# Patient Record
Sex: FEMALE | Race: WHITE
[De-identification: names, ages, dates, MRNs, and addresses within clinical notes are randomized per-mention and may not be internally consistent; named-entity substitution may affect disease eponyms.]

---

## 2018-10-11 NOTE — DIAGNOSTIC IMAGING REPORT
EXAM: Pelvic ultrasound.



INDICATION: Abnormal uterine bleeding



The previous pelvic ultrasound exam of 06/23/2015 failed to show

any sign of an acute pelvic abnormality. On this exam, the uterus

is nongravid, retroverted and not enlarged measuring 5 x 5.0 x

4.0 cm. The endometrial lining is not thickened measuring 5-6 mm.

There is no focal mass involving the uterus to suggest a fibroid.



In the interval since the prior exam, a 4.8 x 3.8 x 4.6 cm

well-circumscribed hypoechoic lesion has developed in the right

adnexa. Most likely this represent a large cyst arising from the

right ovary. This cyst has a generally benign appearance although

there may be a few internal echoes. This suggests that this maybe

slightly complicated by infection and/or hemorrhage.



The left ovary is unremarkable. There is good blood flow to each

ovary and there is no sign of torsion.



There is no solid pelvic mass identified but there was a small

amount of free fluid in the pelvis.



IMPRESSION:

1. There is a large 4.8 x 3.8 x 4.6 cm slightly complicated cyst

associated with the right ovary. If further evaluation is

desired, laparoscopy should be considered. If there is no

intervention at this time, then a short-term (4-6 week) followup

ultrasound exam should be obtained. 

2. Aside from a small amount of free fluid in the pelvis, there

is no acute abnormality noted otherwise.



Dictated by: 



  Dictated on workstation # [id number]

## 2018-11-10 NOTE — DISCHARGE INST-WOMEN'S SERVICE
Discharge Inst-Women's Serv


Depart Medication/Instructions


New, Converted or Re-Newed RX:  RX on Chart





Consults/Follow Up


Additional Follow Up:  Yes


Orders/Referrals


Dr. Ryan in 7-10 days





Activity


Activity:  Activity as Tolerated


Driving Instructions:  You May Drive (do not drive while taking oxycodone)


NO SMOKING:  NO SMOKING


Nothing Inside Vagina:  No Douching, No [place], No Tampons





Diet


Discharge Diet:  No Restrictions


Symptoms to Report to :  Bleeding Excessive, Pain Increased, Fever Over 101 

Degrees F, Vaginal Bleeding Increase, Questions/Concerns


For Any Problems or Questions:  Contact Your Physician





Skin/Wound Care


Infection Signs and Symptoms:  Increased Redness, Foul Odor of Wound, Increased 

Drainage, Skin Itchy or Has a Rash, Increased Swelling, Temperature Above 101  F


Operative Area Clean and Dry:  Keep Incision Clean/Dry


Stitches/Staples/Dermabond:  Dermabond, Care of Stitches


Bathing Instructions:  FANI Irby DO Nov 10, 2018 19:09
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
## 2018-11-10 NOTE — ANESTHESIA-GENERAL POST-OP
General


Patient Condition


Mental Status/LOC:  Same as Preop


Cardiovascular:  Satisfactory


Nausea/Vomiting:  Absent


Respiratory:  Satisfactory


Pain:  Controlled


Complications:  Absent





Post Op Complications


Complications


None





Follow Up Care/Instructions


Patient Instructions


None needed.





Anesthesia/Patient Condition


Patient Condition


Patient is doing well, no complaints, stable vital signs, no apparent adverse 

anesthesia problems.   


No complications reported per nursing.











MARJAN CANNON CRNA Nov 10, 2018 18:35

## 2018-11-10 NOTE — XMS REPORT
[facility]

 Created on: 2018



Lourdes Miramontes

External Reference #: [id number]

: 2000

Sex: Female



Demographics







 Address  [address] [place]  [address]

 

 Preferred Language  Unknown

 

 Marital Status  Unknown

 

 Faith Affiliation  Unknown

 

 Race  Unknown

 

 Ethnic Group  Unknown





Author







 Author  Sabrina Nguyễn  [facility]

 

 Address  [address] [place] [address]



 

 Phone  [phone]







Care Team Providers







 Care Team Member Name  Role  Phone

 

 Sabrina Nguyễn  Unavailable  [phone]







PROBLEMS







 Type  Condition  ICD9-CM Code  AYQ70-RR Code  Onset Dates  Condition Status  
SNOMED Code

 

 Problem  Irregular menses     N92.6     Active  49368233

 

 Problem  Hashimoto's disease     E06.3     Active  23150249

 

 Problem  Other specified hypothyroidism     E03.8     Active  061845063

 

 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







ALLERGIES

No Information



ENCOUNTERS







 Encounter  Location  Date  Diagnosis

 

 [facility]  [address]  [place] [address]  20 Sep, 
2018   

 

 [facility]  [address]  [place] [address]  20 Sep, 
2018   

 

 [facility]  [address]  [place] [address]  19 Sep, 
2018   

 

 [facility]  [address] [place] [address]  14 Sep, 
2018   

 

 [facility]  [address] [place] [address]  14 Sep, 
2018  Irregular menses N92.6 ; Hashimoto's disease E06.3 and At high risk for 
breast cancer Z91.89

 

 [facility]  [address]  [place] [address]  04 Sep, 
2018   

 

 [facility]  [address]  [place] [address]     

 

 [facility]  [address]  [place] [address]     

 

 [facility]  [address] [place] [address]  18 Sep, 
2017   

 

 [facility]  [address]  [place] [address]  17 Sep, 
2017   

 

 [facility]  [address]  [place] [address]  15 Aug, 
2017  Other specified hypothyroidism E03.8

 

 [facility]  [address]  [place] [address]  14 Aug, 
2017   

 

 [facility]  [address]  [place] [address]  14 Aug, 
2017  Other specified hypothyroidism E03.8

 

 [facility]  [address]  [place] [address]    Other specified hypothyroidism E03.8 ; Femoral rotation, congenital Q74.2 
; Family history of breast cancer Z80.3 and At high risk for breast cancer 
Z91.89







IMMUNIZATIONS

No Known Immunizations



SOCIAL HISTORY

Never Assessed



REASON FOR VISIT

Lourdes Miramontes



PLAN OF CARE





VITAL SIGNS





MEDICATIONS

Unknown Medications



RESULTS

No Results



PROCEDURES

No Known procedures



INSTRUCTIONS





MEDICATIONS ADMINISTERED

No Known Medications



MEDICAL (GENERAL) HISTORY







 Type  Description  Date

 

 Medical History  migraine headaches with aura   

 

 Medical History  Hashimotos hypothyroidism, sees endocrine, VC   

 

 Medical History  Maternal grandmother with BRCA 2, patient's mother has had 
not tested positive, saw genetic counselor and no testing needed   

 

 Medical History  bilateral femoral derotation, has hardware in both femurs, 
sees Dr. Zuniga. On narcotics for this issue   

 

 Medical History  Lifetime risk breast cancer 28%   

 

 Surgical History  R knee scope  

 

 Surgical History  bilateral femeral derotation 2014

 

 Surgical History  hardware removal

## 2018-11-10 NOTE — XMS REPORT
[facility]

 Created on: 2018



Lourdes Miramontes

External Reference #: [id number]

: 2000

Sex: Female



Demographics







 Address  [address] [place]  [address]

 

 Preferred Language  Unknown

 

 Marital Status  Unknown

 

 Hoahaoism Affiliation  Unknown

 

 Race  Unknown

 

 Ethnic Group  Unknown





Author







 Author  Sabrina Nguyễn  [facility]

 

 Address  [address] [place] [address]



 

 Phone  [phone]







Care Team Providers







 Care Team Member Name  Role  Phone

 

 Sabrina Nguyễn  Unavailable  [phone]







PROBLEMS







 Type  Condition  ICD9-CM Code  NEC16-AQ Code  Onset Dates  Condition Status  
SNOMED Code

 

 Problem  Irregular menses     N92.6     Active  99361143

 

 Problem  Hashimoto's disease     E06.3     Active  46058374

 

 Problem  Other specified hypothyroidism     E03.8     Active  161562966

 

 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







ALLERGIES

No Information



ENCOUNTERS







 Encounter  Location  Date  Diagnosis

 

 [facility]  [address]  [place] [address]  20 Sep, 
2018   

 

 [facility]  [address]  [place] [address]  20 Sep, 
2018   

 

 [facility]  [address]  [place] [address]  19 Sep, 
2018   

 

 [facility]  [address] [place] [address]  14 Sep, 
2018   

 

 [facility]  [address] [place] [address]  14 Sep, 
2018  Irregular menses N92.6 ; Hashimoto's disease E06.3 and At high risk for 
breast cancer Z91.89

 

 [facility]  [address]  [place] [address]  04 Sep, 
2018   

 

 [facility]  [address]  [place] [address]     

 

 [facility]  [address]  [place] [address]     

 

 [facility]  [address] [place] [address]  18 Sep, 
2017   

 

 [facility]  [address]  [place] [address]  17 Sep, 
2017   

 

 [facility]  [address]  [place] [address]  15 Aug, 
2017  Other specified hypothyroidism E03.8

 

 [facility]  [address]  [place] [address]  14 Aug, 
2017   

 

 [facility]  [address]  [place] [address]  14 Aug, 
2017  Other specified hypothyroidism E03.8

 

 [facility]  [address]  [place] [address]    Other specified hypothyroidism E03.8 ; Femoral rotation, congenital Q74.2 
; Family history of breast cancer Z80.3 and At high risk for breast cancer 
Z91.89







IMMUNIZATIONS

No Known Immunizations



SOCIAL HISTORY

Never Assessed



REASON FOR VISIT

post pelvic exam



PLAN OF CARE





VITAL SIGNS





MEDICATIONS

Unknown Medications



RESULTS

No Results



PROCEDURES

No Known procedures



INSTRUCTIONS





MEDICATIONS ADMINISTERED

No Known Medications



MEDICAL (GENERAL) HISTORY







 Type  Description  Date

 

 Medical History  migraine headaches with aura   

 

 Medical History  Hashimotos hypothyroidism, sees endocrine, VC   

 

 Medical History  Maternal grandmother with BRCA 2, patient's mother has had 
not tested positive, saw genetic counselor and no testing needed   

 

 Medical History  bilateral femoral derotation, has hardware in both femurs, 
sees Dr. Zuniga. On narcotics for this issue   

 

 Medical History  Lifetime risk breast cancer 28%   

 

 Surgical History  R knee scope  

 

 Surgical History  bilateral femeral derotation 2014

 

 Surgical History  hardware removal

## 2018-11-10 NOTE — XMS REPORT
[facility]

 Created on: 2018



Lourdes Miramontes

External Reference #: [id number]

: 2000

Sex: Female



Demographics







 Address  [address] [place]  [address]

 

 Preferred Language  Unknown

 

 Marital Status  Unknown

 

 Sikhism Affiliation  Unknown

 

 Race  Unknown

 

 Ethnic Group  Unknown





Author







 Author  Sabrina Nguyễn

 

 [facility]

 

 Address  [address] [place]  [address]



 

 Phone  [phone]







Care Team Providers







 Care Team Member Name  Role  Phone

 

 Sabrina Nguyễn  Unavailable  [phone]







PROBLEMS







 Type  Condition  ICD9-CM Code  XQV41-AK Code  Onset Dates  Condition Status  
SNOMED Code

 

 Problem  Other specified hypothyroidism     E03.8     Active  941223372

 

 Problem  Femoral rotation, congenital     Q74.2     Active  576169309







ALLERGIES

No Information



SOCIAL HISTORY

Never Assessed



PLAN OF CARE





VITAL SIGNS





MEDICATIONS

Unknown Medications



RESULTS

No Results



PROCEDURES

No Known procedures



IMMUNIZATIONS

No Known Immunizations



MEDICAL (GENERAL) HISTORY







 Type  Description  Date

 

 Medical History  migraine headaches   

 

 Medical History  Hashimotos hypothyroidism   

 

 Medical History  Maternal grandmother with BRCA 2, patient's mother has had 
not tested positive   

 

 Medical History  bilateral femoral derotation, has hardware in both femurs, 
sees Dr. Zuniga. On narcotics for this issue   

 

 Surgical History  R knee scope  

 

 Surgical History  bilateral femeral derotation 2014

 

 Surgical History  hardware removal

## 2018-11-10 NOTE — XMS REPORT
[facility]

 Created on: 2017



Lourdes Miramontes

External Reference #: [id number]

: 2000

Sex: Female



Demographics







 Address  [address] [place]  [address]

 

 Preferred Language  Unknown

 

 Marital Status  Unknown

 

 [employer] Affiliation  Unknown

 

 Race  Unknown

 

 Ethnic Group  Unknown





Author







 Author  Sabrina Nguyễn

 

 [facility]

 

 Address  [address] [place]  [address]



 

 Phone  [phone]







Care Team Providers







 Care Team Member Name  Role  Phone

 

 Sabrina Nguyễn  Unavailable  [phone]







PROBLEMS







 Type  Condition  ICD9-CM Code  STJ43-IU Code  Onset Dates  Condition Status  
SNOMED Code

 

 Problem  Other specified hypothyroidism     E03.8     Active  406672954

 

 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







ALLERGIES

No Information



SOCIAL HISTORY

Never Assessed



PLAN OF CARE





VITAL SIGNS





MEDICATIONS

Unknown Medications



RESULTS

No Results



PROCEDURES

No Known procedures



IMMUNIZATIONS

No Known Immunizations



MEDICAL (GENERAL) HISTORY







 Type  Description  Date

 

 Medical History  migraine headaches   

 

 Medical History  Hashimotos hypothyroidism   

 

 Medical History  Maternal grandmother with BRCA 2, patient's mother has had 
not tested positive   

 

 Medical History  bilateral femoral derotation, has hardware in both femurs, 
sees Dr. Zuniga. On narcotics for this issue   

 

 Surgical History  R knee scope  

 

 Surgical History  bilateral femeral derotation 2014

 

 Surgical History  hardware removal

## 2018-11-10 NOTE — XMS REPORT
[facility]

 Created on: 09/15/2018



Lourdes Miramontes

External Reference #: [id number]

: 2000

Sex: Female



Demographics







 Address  [address] [place]  [address]

 

 Preferred Language  Unknown

 

 Marital Status  Unknown

 

 [employer] Affiliation  Unknown

 

 Race  Unknown

 

 Ethnic Group  Unknown





Author







 Author  Sabrina Nguyễn

 

 [facility]

 

 Address  [address] [place] [address]



 

 Phone  [phone]







Care Team Providers







 Care Team Member Name  Role  Phone

 

 Sabrina Nguyễn  Unavailable  [phone]







PROBLEMS







 Type  Condition  ICD9-CM Code  UKQ00-KK Code  Onset Dates  Condition Status  
SNOMED Code

 

 Problem  Irregular menses     N92.6     Active  43056285

 

 Problem  Hashimoto's disease     E06.3     Active  32401869

 

 Problem  Other specified hypothyroidism     E03.8     Active  711333934

 

 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







ALLERGIES

No Known Allergies



ENCOUNTERS







 Encounter  Location  Date  Diagnosis

 

 [facility]  [address] [place] [address]  14 Sep, 
2018   

 

 [facility]  [address] [place] [address]  14 Sep, 
2018  Irregular menses N92.6 ; Hashimoto's disease E06.3 and At high risk for 
breast cancer Z91.89

 

 [facility]  [address] [place] [address]  04 Sep, 
2018   

 

 [facility]  [address] [place] [address]     

 

 [facility]  [address] [place] [address]     

 

 [facility]  [address] [place] [address]  18 Sep, 
2017   

 

 [facility]  [address] [place] [address]  17 Sep, 
2017   

 

 [facility]  [address] [place] [address]  15 Aug, 
2017  Other specified hypothyroidism E03.8

 

 [facility]  [address] [place] [address]  14 Aug, 
2017   

 

 [facility]  [address] [place] [address]  14 Aug, 
2017  Other specified hypothyroidism E03.8

 

 [facility]  [address] [place] [address]    Other specified hypothyroidism E03.8 ; Femoral rotation, congenital Q74.2 
; Family history of breast cancer Z80.3 and At high risk for breast cancer 
Z91.89







IMMUNIZATIONS

No Known Immunizations



SOCIAL HISTORY

Never Assessed



REASON FOR VISIT

PELVIC EXAM/IRREGULAR BLEEDING



PLAN OF CARE







 Activity  Details









  









 Pending Test  CBC 

 

 Pending Test  COMPREHENSIVE CHEM PROFILE 

 

 Pending Test  HbA1C 

 

 Pending Test  TSH 

 

 Pending Test  FREE T4 

 

 Pending Test  FSH 

 

 Pending Test  LH 

 

 Pending Test  HCG, BETA QUANT 

 

 Pending Test  CHLAMYDIA/GC AMPLIFICATION  #447065 

 

 Pending Test  Prolactin  #899225 

 

 Pending Test  Progesterone  #594448 

 

 Pending Test  KOH/Saline Wet Prep (Vag, Mouth/Tongue/Mucous Membranes) 

 

 Pending Test  DHEA Sulfate  #792022 



             



VITAL SIGNS







 Weight  107.8 lbs  2018

 

 Height  63 in  2018

 

 Temperature  98.2 degrees Fahrenheit  2018

 

 Heart Rate  74 /min  2018

 

 Oximetry  98 %  2018

 

 BMI  19.09 kg/m2  2018

 

 Blood pressure systolic  120 mm Hg  2018

 

 Blood pressure diastolic  82 mm Hg  2018







MEDICATIONS







 Medication  Instructions  Dosage  Frequency  Start Date  End Date  Duration  
Status

 

 Zyrtec Allergy 10 MG  Orally Once a day  1 tablet  24h           Active

 

 Levothyroxine Sodium 25 MCG  Orally Once a day  1 tablet on an empty stomach 
in the morning  24h        90 days  Active

 

 Hydrocodone-Acetaminophen 5-325 MG  Orally every 6 hrs  1 tablet as needed  6h
           Active

 

 Acetaminophen 325 MG  Orally every 6 hrs  2 capsules as needed  6h           
Active







RESULTS

No Results



PROCEDURES







 Procedure  Date Ordered  Result  Body Site

 

 CHLAMYDIA DNA  2018      

 

 GC DNA  2018      

 

 KOH/SALINE  WET PREP  2018      

 

 CBC  2018      

 

 COMP PROFILE  2018      

 

 TSH  2018      

 

 FREE T4  2018      

 

 PROGESTERONE  2018      

 

 DHEA SULFATE  2018      

 

 HgB A1C  2018      

 

 PROLACTIN  2018      

 

 HCG BETA QUANT  2018      

 

 LH  2018      

 

 FSH  2018      







INSTRUCTIONS





MEDICATIONS ADMINISTERED

No Known Medications



MEDICAL (GENERAL) HISTORY







 Type  Description  Date

 

 Medical History  migraine headaches with aura   

 

 Medical History  Hashimotos hypothyroidism, sees endocrine, VC   

 

 Medical History  Maternal grandmother with BRCA 2, patient's mother has had 
not tested positive, saw genetic counselor and no testing needed   

 

 Medical History  bilateral femoral derotation, has hardware in both femurs, 
sees Dr. Zuniga. On narcotics for this issue   

 

 Medical History  Lifetime risk breast cancer 28%   

 

 Surgical History  R knee scope  

 

 Surgical History  bilateral femeral derotation 2014

 

 Surgical History  hardware removal

## 2018-11-10 NOTE — XMS REPORT
[facility]

 Created on: 2017



Lourdes Miramontes

External Reference #: [id number]

: 2000

Sex: Female



Demographics







 Address  [address] [place]  [address]

 

 Preferred Language  Unknown

 

 Marital Status  Unknown

 

 Sabianism Affiliation  Unknown

 

 Race  Unknown

 

 Ethnic Group  Unknown





Author







 Author  Sabrina Nguyễn

 

 [facility]

 

 Address  [address] [place]  [address]



 

 Phone  [phone]







Care Team Providers







 Care Team Member Name  Role  Phone

 

 Sabrina Nguyễn  Unavailable  [phone]







PROBLEMS







 Type  Condition  ICD9-CM Code  VMX24-AO Code  Onset Dates  Condition Status  
SNOMED Code

 

 Problem  Other specified hypothyroidism     E03.8     Active  563504742

 

 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







ALLERGIES







 Substance  Reaction  Event Type  Date  Status

 

 N.K.D.A.  Unknown  Non Drug Allergy    Unknown







SOCIAL HISTORY

No smoking Hx information available



PLAN OF CARE







 Activity  Details









  









 Follow Up  prn Reason:







VITAL SIGNS







 Weight  104.4 lbs  2017

 

 Height  63 in  2017

 

 Temperature  98.6 degrees Fahrenheit  2017

 

 Heart Rate  74 /min  2017

 

 Oximetry  100 %  2017

 

 BMI  18.49 kg/m2  2017

 

 Blood pressure systolic  98 mm Hg  2017

 

 Blood pressure diastolic  66 mm Hg  2017







MEDICATIONS







 Medication  Instructions  Dosage  Frequency  Start Date  End Date  Duration  
Status

 

 Acetaminophen 325 MG  Orally every 6 hrs  2 capsules as needed  6h           
Active

 

 Zyrtec Allergy 10 MG  Orally Once a day  1 tablet  24h           Active

 

 Levothyroxine Sodium 25 MCG  Orally Once a day  1 tablet on an empty stomach 
in the morning  24h        90 days  Active

 

 Hydrocodone-Acetaminophen 5-325 MG  Orally every 6 hrs  1 tablet as needed  6h
           Active







RESULTS

No Results



PROCEDURES







 Procedure  Date Ordered  Related Diagnosis  Body Site

 

 OFFICE VISITNEW PT  2017      







IMMUNIZATIONS

No Known Immunizations

## 2018-11-10 NOTE — XMS REPORT
[facility]

 Created on: 2017



Lourdes Miramontes

External Reference #: [id number]

: 2000

Sex: Female



Demographics







 Address  [address] [place]  [address]

 

 Preferred Language  Unknown

 

 Marital Status  Unknown

 

 Christian Affiliation  Unknown

 

 Race  Unknown

 

 Ethnic Group  Unknown





Author







 Author  Sabrina Nguyễn

 

 [facility]

 

 Address  [address] [place]  [address]



 

 Phone  [phone]







Care Team Providers







 Care Team Member Name  Role  Phone

 

 Sabrina Nguyễn  Unavailable  [phone]







PROBLEMS







 Type  Condition  ICD9-CM Code  FPR12-PW Code  Onset Dates  Condition Status  
SNOMED Code

 

 Problem  Other specified hypothyroidism     E03.8     Active  435890034

 

 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







ALLERGIES

Unknown Allergies



SOCIAL HISTORY

No smoking Hx information available



PLAN OF CARE





VITAL SIGNS





MEDICATIONS

Unknown Medications



RESULTS

No Results



PROCEDURES

No Known procedures



IMMUNIZATIONS

No Known Immunizations

## 2018-11-10 NOTE — OPERATIVE REPORT
DATE OF SERVICE:  



PREOPERATIVE DIAGNOSES:

1.  A 18-year-old female with acute onset pelvic pain.

2.  Bilateral ovarian cyst.



POSTOPERATIVE DIAGNOSES:

1.  A 18-year-old female with acute onset pelvic pain.

2.  Bilateral ovarian cyst.

3.  Adhesions of the omentum to the anterior cul-de-sac.



PROCEDURE:

Laparoscopic right ovarian cystectomy, left ovarian cystotomy with lysis of

adhesions and evacuation of hemoperitoneum.



SURGEON:

Saad Nam DO



ANESTHESIA:

General endotracheal.



ESTIMATED BLOOD LOSS:

Minimal.



URINE OUTPUT:

50 mL, clear at the end of the procedure.



FLUIDS:

1000 mL lactated Ringer solution.



FINDINGS:

Mildly enlarged right ovary with a simple-appearing right ovarian cyst, several

small multicystic appearing cyst of the left ovary and adhesions of the omentum

to the anterior cul-de-sac.



SPECIMENS SENT:

Right ovarian cyst wall.



INDICATIONS FOR PROCEDURE:

This 18-year-old female is a patient that I ended up admitting after a complaint

of significant amounts of pain that she was having.  The patient had dealt with

ovarian cyst in the past and was followed in my office as an outpatient with

ovarian cyst.  She was attempted to be managed medically.  However, since that

time, her symptoms have progressively gotten worse to the point that they are

kept her up all night and has inhibit her from eating most of the day today. 

Due to these ongoing issues and unrelenting pain today causing the patient to

miss testing, the patient wanted to proceed with more aggressive measures.  At

this point, I discussed with the patient diagnostic laparoscopy.  Risks of

procedure were discussed with the patient in detail.  After all of her questions

were answered, consent was obtained in the preoperative area and the patient was

taken to the operating room.



OPERATIVE REPORT IN DETAIL:

Once in the operating room, general anesthesia was found to be adequate.  She

was placed in the dorsal lithotomy position, prepped and draped in normal

sterile fashion _____ performed a timeout and placing a Schaefer catheter using

sterile technique.  A weighted speculum was inserted into the patient's vagina. 

A right angle retractor was used to visualize the cervix, which was grasped at

12 o'clock position using a long Allis clamp.  I then gently sound the uterine

cavity depth, it was found to be 7 cm.  I then gently dilated the cervix using

Hegar dilators to allow me to place a Kronner uterine manipulator.  Once this

was in place, I removed all the other instruments and performed a change of

gloves.  I took my attention to the abdomen where infraumbilically I infiltrated

this area using 0.25% Marcaine to make a 5 mm incision and directed Veress

needle through the incision until intraperitoneal placement was confirmed using

a saline drop test.  I then proceeded with insufflation using CO2 gas and

opening pressure of 5 mmHg was noted.  I proceeded to maximum pressure of 15

mmHg, at which point I removed the Veress needle and introduced a 5 mm blunt

camera trocar.  Once this was in place, I am able to confirm intraperitoneal

placement using the laparoscope.  I briefly scanned the upper abdominal anatomy.

 I am able to visualize the diaphragm as well as the appendix, which appears

grossly normal appearance.  There is arjun blood noted in the peritoneum of the

pelvis.  I evacuated this after I placed the second trocar.  A suprapubic 5 mm

in the similar fashion to my infraumbilical trocar; however, this one was placed

under direct visualization of the laparoscope.  Once this was in place, I

suctioned out the pelvis using suction irrigation.  I decided to remove the

right ovarian cyst and a portion of the cyst wall.  I do so by placing a third

trocar site this was in the left lower quadrant in similar fashion.  It was also

placed under direct visualization of the laparoscope.  Once this was in place, I

am able to grasp the ovary and isolated.  Therefore, it does not move and then I

am able to create an elliptical incision around the cyst wall of the right

ovarian cyst and gently pulled and dissected off of the right ovary.  Once this

was taken off, I am able to remove one of my trocar sites and sent as right

ovarian cyst wall.  There was no active bleeding noted from anywhere on the

dissection plane of the right ovary.  I then isolated the left ovary and find

multiple small loculated cysts, which were ruptured using EndoShears monopolar

cautery.  Once this was done, there was no active bleeding noted from any of

those cysts as well.  There were also filmy adhesions of multiple sites of the

peritoneum to the anterior cul-de-sac.  These were taken down using the

EndoShears as well, after which there was no active bleeding noted from any of

those dissection planes.  Once again, I copiously irrigated the pelvis using

normal saline.  Once again, there was no active bleeding noted from any of my

dissection planes.  I had the patient taken out of steep Trendelenburg.  I

removed the left lower quadrant trocar under direct visualization of the

laparoscope.  The infraumbilical trocar was also removed under direct

visualization of the laparoscope by moving the camera to the suprapubic port.  I

then leave the suprapubic port in place in order to release insufflation and to

introduce 10 mL of 0.25% Marcaine into the peritoneal cavity.  After this was

done, I then removed the suprapubic port.  I then closed the skin using

Dermabond and Band-Aids were placed over the incisions.  The Kronner uterine

manipulator and Schaefer catheter were removed.  After this was done, the patient

tolerated the procedure well and sent to the recovery area in stable condition. 

Lap and sponge counts were correct at the end of the procedure.  Instrument

counts were correct as well.





Job ID: [id number]

DocumentID: [id number]

Dictated Date:  11/10/2018 19:19:04

Transcription Date: 11/10/2018 23:42:05

Dictated By: SAAD NAM DO

## 2018-11-10 NOTE — XMS REPORT
[facility]

 Created on: 2018



Lourdse Miramontes

External Reference #: [id number]

: 2000

Sex: Female



Demographics







 Address  [address] [place]  [address]

 

 Preferred Language  Unknown

 

 Marital Status  Unknown

 

 Mu-ism Affiliation  Unknown

 

 Race  Unknown

 

 Ethnic Group  Unknown





Author







 Author  Sabrina Nguyễn  [facility]

 

 Address  [address] [place] [address]



 

 Phone  [phone]







Care Team Providers







 Care Team Member Name  Role  Phone

 

 Sabrina Nguyễn  Unavailable  [phone]







PROBLEMS







 Type  Condition  ICD9-CM Code  FAS38-BB Code  Onset Dates  Condition Status  
SNOMED Code

 

 Problem  Irregular menses     N92.6     Active  15603192

 

 Problem  Hashimoto's disease     E06.3     Active  62653750

 

 Problem  Other specified hypothyroidism     E03.8     Active  430829734

 

 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







ALLERGIES

No Information



ENCOUNTERS







 Encounter  Location  Date  Diagnosis

 

 [facility]  [address]  [place] [address]  20 Sep, 
2018   

 

 [facility]  [address]  [place] [address]  20 Sep, 
2018   

 

 [facility]  [address]  [place] [address]  19 Sep, 
2018   

 

 [facility]  [address] [place] [address]  14 Sep, 
2018   

 

 [facility]  [address] [place] [address]  14 Sep, 
2018  Irregular menses N92.6 ; Hashimoto's disease E06.3 and At high risk for 
breast cancer Z91.89

 

 [facility]  [address]  [place] [address]  04 Sep, 
2018   

 

 [facility]  [address]  [place] [address]     

 

 [facility]  [address]  [place] [address]     

 

 [facility]  [address] [place] [address]  18 Sep, 
2017   

 

 [facility]  [address]  [place] [address]  17 Sep, 
2017   

 

 [facility]  [address]  [place] [address]  15 Aug, 
2017  Other specified hypothyroidism E03.8

 

 [facility]  [address]  [place] [address]  14 Aug, 
2017   

 

 [facility]  [address]  [place] [address]  14 Aug, 
2017  Other specified hypothyroidism E03.8

 

 [facility]  [address]  [place] [address]    Other specified hypothyroidism E03.8 ; Femoral rotation, congenital Q74.2 
; Family history of breast cancer Z80.3 and At high risk for breast cancer 
Z91.89







IMMUNIZATIONS

No Known Immunizations



SOCIAL HISTORY

Never Assessed



REASON FOR VISIT

Abbagail



PLAN OF CARE





VITAL SIGNS





MEDICATIONS

Unknown Medications



RESULTS

No Results



PROCEDURES

No Known procedures



INSTRUCTIONS





MEDICATIONS ADMINISTERED

No Known Medications



MEDICAL (GENERAL) HISTORY







 Type  Description  Date

 

 Medical History  migraine headaches with aura   

 

 Medical History  Hashimotos hypothyroidism, sees endocrine, VC   

 

 Medical History  Maternal grandmother with BRCA 2, patient's mother has had 
not tested positive, saw genetic counselor and no testing needed   

 

 Medical History  bilateral femoral derotation, has hardware in both femurs, 
sees Dr. Zuniga. On narcotics for this issue   

 

 Medical History  Lifetime risk breast cancer 28%   

 

 Surgical History  R knee scope  

 

 Surgical History  bilateral femeral derotation 2014

 

 Surgical History  hardware removal
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
## 2018-11-10 NOTE — XMS REPORT
Transition of Care/Referral Summary

 Created on: 2032



MEGAN WILKS

External Reference #: [id number]

: 2000

Sex: Female



Demographics







 [place]  [address]

 

 Home Phone  [phone]

 

 Preferred Language  English

 

 Marital Status  Single

 

 Zoroastrian Affiliation  Other

 

 Race  White

 

 Additional Race(s)  

 

 Ethnic Group  Not  or 





Author







 Author  [facility]

 

 Organization  [facility]

 

 Address  Unknown

 

 Phone  Unavailable







Care Team Providers







 Care Team Member Name  Role  Phone

 

 DelmaSabrina azevedo  PCP  [phone]

 

 Mamta Guzman  PCP  [phone]







Encounter





VC FIN [id number] Date(s): 3/23/18 - 3/23/18

[facility] [address] [place] [address]
[facility] [phone]

Encounter Diagnosis

Hashimoto's thyroiditis (Discharge Diagnosis) - 3/23/18

Discharge Disposition: 01-Home or Self Care

Attending Physician: Randi Esteves MD





Vital Signs







 



  Most recent to   1



  oldest [Reference 



  Range]: 

 

 



  Peripheral Pulse   87 bpm



  Rate [ bpm]   (3/23/18 8:24 AM)

 

 



  Blood Pressure   110/70 mmHg



  [/60-90 mmHg]   (3/23/18 8:24 AM)







Problem List







    



  Condition   Effective Dates   Status   Health Status   Informant

 

    



  Hashimoto's    Active  



  thyroiditis(Confirme    



  d)    







Allergies, Adverse Reactions, Alerts





No Known Allergies



Medications





acetaminophen

0 Refill(s)

Start Date: 3/23/18

Status: Ordered



levothyroxine 25 mcg (0.025 mg) oral tablet

25 mcg 1 tabs, Oral, Daily, # 30 tabs, 0 Refill(s)

Start Date: 3/23/18

Status: Ordered



Motrin IB

mg, Oral, q6hr, 0 Refill(s)

Start Date: 8/24/15

Status: Ordered



Results





Chemistry





 



  Most recent to   1



  oldest [Reference 



  Range]: 

 

 



  T4 Free [0.7-1.5   1.0 ng/dL



  ng/dL]   (3/23/18 9:06 AM)

 

 



  TSH [0.35-4.94   5.87 mcIU/mL



  mcIU/mL]   *HI*



   (3/23/18 9:06 AM)

 

 



  T3 Free [1.7-3.7   2.5 pg/mL



  pg/mL]   (3/23/18 9:06 AM)







Immunizations





No data available for this section



Procedures







    



  Procedure   Date   Related Diagnosis   Body Site   Status

 

    



  Collection of venous blood by venipuncture   3/23/18     Completed

 

    



  Miscellaneous operations1   7/14/15     Completed







1Bilateral femoral derotation osteotomy



Social History







 



  Social History Type   Response

 

 



  Smoking Status   Never smoker



   entered on: 8/24/15







Assessment and Plan

Extracted from:





  



  Title: Office Visit Note   Author: Randi Esteves MD   Date: 3/23/18









           1) Hashimoto thyroiditis/Hypothyroidism

 

 - Discussed nature of disease and outcomes.

 - Repeat Thyroid functions.

 - Consider switching to brand name thyroid hormone.

 - Counseled on the proper way of taking thyroid hormone.

 

 

 Thank you for Referring to Endocrinology, will continue to follow up.
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
## 2018-11-10 NOTE — XMS REPORT
Formerly [facility]

 Created on: 2017



Lourdes Miramontes

External Reference #: [id number]

: 2000

Sex: Female



Demographics







 Address  [address] [place]  [address]

 

 Preferred Language  Unknown

 

 Marital Status  Unknown

 

 Adventism Affiliation  Unknown

 

 Race  Unknown

 

 Ethnic Group  Unknown





Author







 Author  Sabrina Nguyễn

 

 [facility]

 

 Address  [address] [place]  [address]



 

 Phone  [phone]







Care Team Providers







 Care Team Member Name  Role  Phone

 

 Sabrina Nguyễn  Unavailable  [phone]







PROBLEMS







 Type  Condition  ICD9-CM Code  GVR78-GP Code  Onset Dates  Condition Status  
SNOMED Code

 

 Problem  Other specified hypothyroidism     E03.8     Active  535666553

 

 Problem  Femoral rotation, congenital     Q74.2     Active  482249950







ALLERGIES

Unknown Allergies



SOCIAL HISTORY

No smoking Hx information available



PLAN OF CARE







 Activity  Details









  









 Follow Up  prn Reason:







VITAL SIGNS





MEDICATIONS

Unknown Medications



RESULTS







 Name  Result  Date  Reference Range

 

 TSH     2017-08-15   

 

 TSH  4.93     0.40-5.00

 

 FREE T4     2017-08-15   

 

 Free T4  1.27     0.93-1.70

 

 T3, Free, serum  #[id number]     2017-08-15   







PROCEDURES







 Procedure  Date Ordered  Related Diagnosis  Body Site

 

 TSH  Aug 15, 2017      

 

 FREE T4  Aug 15, 2017      

 

 FREE T3  Aug 15, 2017      







IMMUNIZATIONS

No Known Immunizations
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
## 2018-11-10 NOTE — XMS REPORT
[facility]

 Created on: 08/15/2017



Lourdes Miramontes

External Reference #: [id number]

: 2000

Sex: Female



Demographics







 Address  [address] [place]  [address]

 

 Preferred Language  Unknown

 

 Marital Status  Unknown

 

 Scientology Affiliation  Unknown

 

 Race  Unknown

 

 Ethnic Group  Unknown





Author







 Author  Sabrina Nguyễn

 

 [facility]

 

 Address  [address] [place]  [address]



 

 Phone  [phone]







Care Team Providers







 Care Team Member Name  Role  Phone

 

 Sabrina Nguyễn  Unavailable  [phone]







PROBLEMS







 Type  Condition  ICD9-CM Code  GOJ88-BM Code  Onset Dates  Condition Status  
SNOMED Code

 

 Problem  Other specified hypothyroidism     E03.8     Active  620639446

 

 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







ALLERGIES

Unknown Allergies



SOCIAL HISTORY

No smoking Hx information available



PLAN OF CARE





VITAL SIGNS





MEDICATIONS

Unknown Medications



RESULTS

No Results



PROCEDURES

No Known procedures



IMMUNIZATIONS

No Known Immunizations

## 2018-11-10 NOTE — XMS REPORT
[facility]

 Created on: 09/15/2018



Lourdes Miramontes

External Reference #: [id number]

: 2000

Sex: Female



Demographics







 Address  [address] [place]  [address]

 

 Preferred Language  Unknown

 

 Marital Status  Unknown

 

 Rastafari Affiliation  Unknown

 

 Race  Unknown

 

 Ethnic Group  Unknown





Author







 Author  Sabrina Nguyễn

 

 [facility]

 

 Address  [address] [place] [address]



 

 Phone  [phone]







Care Team Providers







 Care Team Member Name  Role  Phone

 

 Sabrina Nguyễn  Unavailable  [phone]







PROBLEMS







 Type  Condition  ICD9-CM Code  BVM87-YP Code  Onset Dates  Condition Status  
SNOMED Code

 

 Problem  Irregular menses     N92.6     Active  58126938

 

 Problem  Hashimoto's disease     E06.3     Active  44338010

 

 Problem  Other specified hypothyroidism     E03.8     Active  776308455

 

 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







ALLERGIES

No Information



ENCOUNTERS







 Encounter  Location  Date  Diagnosis

 

 [facility]  [address]  [place] [address]  14 Sep, 
2018   

 

 [facility]  [address] [place] [address]  14 Sep, 
2018  Irregular menses N92.6 ; Hashimoto's disease E06.3 and At high risk for 
breast cancer Z91.89

 

 [facility]  [address] [place] [address]  04 Sep, 
2018   

 

 [facility]  [address] [place] [address]     

 

 [facility]  [address] [place] [address]     

 

 [facility]  [address] [place] [address]  18 Sep, 
2017   

 

 [facility]  [address] [place] [address]  17 Sep, 
2017   

 

 [facility]  [address] [place] [address]  15 Aug, 
2017  Other specified hypothyroidism E03.8

 

 [facility]  [address] [place] [address]  14 Aug, 
2017   

 

 [facility]  [address] [place] [address]  14 Aug, 
2017  Other specified hypothyroidism E03.8

 

 [facility]  [address] [place] [address]    Other specified hypothyroidism E03.8 ; Femoral rotation, congenital Q74.2 
; Family history of breast cancer Z80.3 and At high risk for breast cancer 
Z91.89







IMMUNIZATIONS

No Known Immunizations



SOCIAL HISTORY

Never Assessed



REASON FOR VISIT

Orders



PLAN OF CARE





VITAL SIGNS





MEDICATIONS

Unknown Medications



RESULTS

No Results



PROCEDURES

No Known procedures



INSTRUCTIONS





MEDICATIONS ADMINISTERED

No Known Medications



MEDICAL (GENERAL) HISTORY







 Type  Description  Date

 

 Medical History  migraine headaches with aura   

 

 Medical History  Hashimotos hypothyroidism, sees endocrine, [facility]   

 

 Medical History  Maternal grandmother with BRCA 2, patient's mother has had 
not tested positive, saw genetic counselor and no testing needed   

 

 Medical History  bilateral femoral derotation, has hardware in both femurs, 
sees Dr. Zuniga. On narcotics for this issue   

 

 Medical History  Lifetime risk breast cancer 28%   

 

 Surgical History  R knee scope  

 

 Surgical History  bilateral femeral derotation 2014

 

 Surgical History  hardware removal

## 2018-11-10 NOTE — HISTORY & PHYSICAL-OB/GYN
History of Present Illness


History of Present Illness


Reason for visit/HPI


This 18-year-old female who is an established patient with me as contacted me 

last night with sudden onset severe pelvic pain that she described a 12 at 10.  

This concern may significantly due to the fact the patient has had extremely 

painful surgeries in the past including hip rotation ostomy surgery.  The 

patient reports she was awoken from sleep, she took naproxen which somewhat 

decreased the pain, however, it continues today and is steadily getting worse 

and worse.  She reports about 4 hours ago she was able to keep it at bay by 

laying flat but now not even that is helping.  She reports that is sometimes 7-

8 out of pain and is bringing tears to her eyes at times.  She missed an 

examination this morning due to the fact that she could not focus as she is 

distracted by this significant pain.  She reports that her menstrual cycles 

have been irregular and that her endocrinologist has been managing her thyroid, 

other than that she reports no acute changes in her medical history since 

seeing me in the office.  On previous ultrasound there was bilateral ovarian 

cyst however more so on the left side with the largest being 4.8 cm and 

complex.  I saw her in my office earlier and performed a bedside unofficial 

ultrasound which revealed subtle enlargement of the left ovary to just over 5 cm

, as well as multicystic appearing right ovary.


Date of Admission


Nov 10, 2018 at 2:33 pm


Date Seen by a Provider:  Nov 10, 2018


Time Seen by a Provider:  15:14


I consulted on this patient on


11/10/18


 15:13


Attending Physician


Fani Nam DO


Admitting Physician


Fani Nam DO


Consult








Allergies and Home Medications


Allergies


Coded Allergies:  


     No Known Drug Allergies (Unverified , 11/10/18)





Patient Home Medication List


Home Medication List Reviewed:  Yes





Past Medical-Social-Family Hx


Patient Social History


Marrital Status:  single


Number of Children:  0


Number of living children:  0


Employed/Student:  part-time employed, student, full-time


Alcohol Use:  Denies Use


Recreational Drug Use:  No


Smoking Status:  Never a Smoker


Sexual Abuse:  No


Recent Foreign Travel:  No


Contact w/other who traveled:  No





Seasonal Allergies


Seasonal Allergies:  No





Surgeries


Yes


Joint Replacement (hip replacement as well as knee scope)





Respiratory


No





Reproductive System


Pregnant:  No


Hx Reproductive Disorders:  No


Sexually Transmitted Disease:  No


HIV/AIDS:  No


Female Reproductive Disorders:  Menstrual Problems (irregularities), Ovarian 

Cyst





Genitourinary


No





Gastrointestinal


No





Musculoskeletal


No





Endocrine


Endocrine Disorders:  Hypothyroidsim


Are Your Blood Sugars Over 250:  No





HEENT


History of HEENT Disorders:  No





Cancer


No





Psychosocial


History of Psychiatric Problem:  No





Integumentary


History of Skin or Integumenta:  No





Blood Transfusions


History of Blood Disorders:  No


Adverse Reaction to a Blood Tr:  No





Family Medical History


Significant Family History:  No Pertinent Family Hx





Review of Systems


Constitutional:  see HPI


EENTM:  see HPI


Respiratory:  see HPI


Cardiovascular:  see HPI


Genitourinary:  see HPI


Pregnant:  No


Musculoskeletal:  see HPI


Skin:  see HPI


Psychiatric/Neurological:  See HPI





All Other Systems Reviewed


Negative Unless Noted:  Yes





Physical Exam


Physical Exam


Vital Signs


Capillary Refill :


Labs


Laboratory Tests


11/10/18 15:05: 





General Appearance:  Anxious, Mild Distress


Respiratory:  Lungs Clear, Normal Breath Sounds


Cardiovascular:  Regular Rate, Rhythm, No Edema


Abdominal:  soft, tenderness (diffuse tenderness in bilateral lower quadrants.  

Mild rebound tenderness no guarding or rigidity)


Pelvic Exam:  deferred


Extremity:  Normal Capillary Refill





Assessment/Plan


Assessment and Plan


Plan:


I discussed the patient conservative management monitoring however she is 

getting worse since last evening and wants to proceed with more aggressive 

measures that she has been following this pain that has been subtle for the 

past 3-4 weeks and is concerned that it will only continue to worsen.  I 

discussed the patient proceeding with diagnostic laparoscopy, this may include 

salpingectomy on either side or oophorectomy on other side she demonstrates 

understanding for this and knows that her fertility may be compromised however 

we will do our upmost to avoid compromising her long-term fertility and 

repercussions surrounding that.  Risk of the procedure was discussed the 

patient in detail including risk of bleeding, infection, damaging surrounding 

structures including but not limited to bowel, bladder, ureter, kidneys, damage 

the uterus itself, and loss of fertility.  We discussed possible need for blood 

transfusion, possible laparotomy, risk from anesthesia, and even death.  After 

all of her questions were answered and consent was obtained or staff was 

notified and we'll proceed as soon as they are ready.





Admission Diagnosis


20-year-old female with acute onset pelvic pain


Mild rebound tenderness


Bilateral ovarian cyst


Admission Status:  Observation











FANI NAM DO Nov 10, 2018 3:19 pm

## 2018-11-10 NOTE — XMS REPORT
[facility]

 Created on: 2018



Lourdes Miramontes

External Reference #: [id number]

: 2000

Sex: Female



Demographics







 Address  [address] [place]  [address]

 

 Preferred Language  Unknown

 

 Marital Status  Unknown

 

 Moravian Affiliation  Unknown

 

 Race  Unknown

 

 Ethnic Group  Unknown





Author







 Author  Sabrina Nguyễn

 

 [facility]

 

 Address  [address] [place] [address]



 

 Phone  [phone]







Care Team Providers







 Care Team Member Name  Role  Phone

 

 Sabrina Nguyễn  Unavailable  [phone]







PROBLEMS







 Type  Condition  ICD9-CM Code  JHP09-GI Code  Onset Dates  Condition Status  
SNOMED Code

 

 Problem  Other specified hypothyroidism     E03.8     Active  [id number]

 

 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







ALLERGIES

No Information



ENCOUNTERS







 Encounter  Location  Date  Diagnosis

 

 [facility]  [address] [place] [address]  14 Sep, 
2018   

 

 [facility]  [address] [place] [address]  04 Sep, 
2018   

 

 [facility]  [address] [place] [address]     

 

 [facility]  [address] [place] [address]     

 

 [facility]  [address] [place] [address]  18 Sep, 
2017   

 

 [facility]  [address] [place] [address]  17 Sep, 
2017   

 

 [facility]  [address] [place] [address]  15 Aug, 
2017  Other specified hypothyroidism E03.8

 

 [facility]  [address] [place] [address]  14 Aug, 
2017   

 

 [facility]  [address] [place] [address]  14 Aug, 
2017  Other specified hypothyroidism E03.8

 

 [facility]  [address] [place] [address]    Other specified hypothyroidism E03.8 ; Femoral rotation, congenital Q74.2 
; Family history of breast cancer Z80.3 and At high risk for breast cancer 
Z91.89







IMMUNIZATIONS

No Known Immunizations



SOCIAL HISTORY

Never Assessed



REASON FOR VISIT

IRREGULAR PERIODS



PLAN OF CARE





VITAL SIGNS





MEDICATIONS

Unknown Medications



RESULTS

No Results



PROCEDURES

No Known procedures



INSTRUCTIONS





MEDICATIONS ADMINISTERED

No Known Medications



MEDICAL (GENERAL) HISTORY







 Type  Description  Date

 

 Medical History  migraine headaches   

 

 Medical History  Hashimotos hypothyroidism   

 

 Medical History  Maternal grandmother with BRCA 2, patient's mother has had 
not tested positive   

 

 Medical History  bilateral femoral derotation, has hardware in both femurs, 
sees Dr. Zuniga. On narcotics for this issue   

 

 Surgical History  R knee scope  

 

 Surgical History  bilateral femeral derotation 2014

 

 Surgical History  hardware removal

## 2018-11-11 NOTE — PROGRESS NOTE-STANDARD
Standard Progress Note


Progress Notes/Assess & Plan


Date Seen by a Provider:  Nov 11, 2018


Time Seen by a Provider:  08:45


Progress/Assessment & Plan


Patient feeling much better today, sore from gas under the diaphragm post op 

from laparoscopy.  








Vital Sign - Last 24 Hours








 11/10/18 11/10/18 11/10/18 11/11/18





 15:34 18:55 23:36 04:00


 


Temp 98.4 97.4 97.8 98.7


 


Pulse 73 88 81 84


 


Resp 18 18 18 18


 


B/P (MAP) 103/69 (80) 104/66 (79) 108/70 (83) 113/69 (84)


 


Pulse Ox 100 100 100 100


 


O2 Delivery Room Air Room Air Room Air Room Air














Intake and Output   


 


 11/10/18 11/10/18 11/11/18





 15:00 23:00 07:00


 


Intake Total  2000 ml 500 ml


 


Output Total  50 ml 1000 ml


 


Balance  1950 ml -500 ml





Incisions- c/d/i





Diagnosis:


POD 1 Laparoscopic right ovarian cystectomy, left ovarian cystotomy, ARMEN, 

evacuation hematoperitoneum











FANI NAM DO Nov 11, 2018 9:26 am
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
## 2020-05-05 ENCOUNTER — HOSPITAL ENCOUNTER (OUTPATIENT)
Dept: HOSPITAL 75 - RAD | Age: 20
End: 2020-05-05
Attending: OBSTETRICS & GYNECOLOGY
Payer: COMMERCIAL

## 2020-05-05 DIAGNOSIS — R10.11: Primary | ICD-10-CM

## 2020-05-05 PROCEDURE — 76700 US EXAM ABDOM COMPLETE: CPT

## 2020-05-05 NOTE — DIAGNOSTIC IMAGING REPORT
INDICATION:  Right upper quadrant abdominal pain



TECHNIQUE:  Multiple real-time gray scale sonographic images of

the abdomen.



CORRELATION STUDY:  None



FINDINGS:

LIVER:  Normal echotexture within the visualized portions of the

liver.  There is normal, hepatopedal direction of flow within the

main portal vein.  Liver length is normal, 16 cm.

GALLBLADDER: No shadowing gallstones or pericholecystic fluid.

COMMON BILE DUCT: Nondilated at  4 mm.

PANCREAS: Limited in visualization.  The visualized portions

appearing unremarkable.

SPLEEN:  Unremarkable.

ABDOMINAL AORTA: Unremarkable.

INFERIOR VENA CAVA: Limited in visualization.

RIGHT KIDNEY: 10.0 x 4.4 x 3.8 cm. Unremarkable.

LEFT KIDNEY:   There is limited and incomplete visualization of

the left kidney.

OTHER: None.





IMPRESSION:

1. Unremarkable-appearing abdominal ultrasound evaluation.



Dictated by: 



  Dictated on workstation # [id number]

## 2020-05-08 ENCOUNTER — HOSPITAL ENCOUNTER (OUTPATIENT)
Dept: HOSPITAL 75 - CARD | Age: 20
End: 2020-05-08
Attending: SURGERY
Payer: COMMERCIAL

## 2020-05-08 DIAGNOSIS — R11.0: ICD-10-CM

## 2020-05-08 DIAGNOSIS — R10.11: Primary | ICD-10-CM

## 2020-05-08 PROCEDURE — 78227 HEPATOBIL SYST IMAGE W/DRUG: CPT

## 2020-05-08 NOTE — DIAGNOSTIC IMAGING REPORT
EXAMINATION: Gallbladder scintigraphy



HISTORY: Right upper quadrant pain



COMPARISON: None available.



TECHNIQUE: Anterior scintigraphic imaging of the abdomen was

performed after the intravenous administration of 4.75 mCi Tc-99m

Choletec.



FINDINGS:  The upper abdomen was imaged for 60 minutes with the

gamma camera. There is prompt homogeneous uptake of

radiopharmaceutical by the liver. There is activity in the common

duct and gallbladder by 10 minutes. Small bowel activity is seen

by 40 minutes.



At 60 minutes, the patient received 8 ounces of ensure.  After an

additional 30 minutes, the gallbladder ejection fraction was

calculated to be 51% (normal is >35%)



IMPRESSION:  

1. Patent common and cystic bile ducts.

2. No gallbladder dysfunction. 



Dictated by: 



  Dictated on workstation # [id number]

## 2020-05-14 ENCOUNTER — HOSPITAL ENCOUNTER (OUTPATIENT)
Dept: HOSPITAL 75 - SDC | Age: 20
Discharge: HOME | End: 2020-05-14
Attending: SURGERY
Payer: COMMERCIAL

## 2020-05-14 ENCOUNTER — HOSPITAL ENCOUNTER (OUTPATIENT)
Dept: HOSPITAL 75 - LAB FS | Age: 20
End: 2020-05-14
Attending: SURGERY
Payer: COMMERCIAL

## 2020-05-14 VITALS — WEIGHT: 126.32 LBS | BODY MASS INDEX: 21.57 KG/M2 | HEIGHT: 64.02 IN

## 2020-05-14 DIAGNOSIS — Z01.818: Primary | ICD-10-CM

## 2020-05-14 DIAGNOSIS — Z11.59: ICD-10-CM

## 2020-05-14 PROCEDURE — 87635 SARS-COV-2 COVID-19 AMP PRB: CPT

## 2021-04-19 ENCOUNTER — HOSPITAL ENCOUNTER (OUTPATIENT)
Dept: HOSPITAL 75 - RAD | Age: 21
End: 2021-04-19
Attending: SURGERY
Payer: COMMERCIAL

## 2021-04-19 DIAGNOSIS — R10.11: Primary | ICD-10-CM

## 2021-04-19 PROCEDURE — 74018 RADEX ABDOMEN 1 VIEW: CPT

## 2021-04-19 NOTE — DIAGNOSTIC IMAGING REPORT
INDICATION: 

Right upper quadrant pain.



EXAMINATION:

KUB at 3:52 PM.



FINDINGS:

The bowel gas pattern is normal. There are no pathologic masses

seen. There is a 2 mm calcification projecting over the left

renal pelvis that could be a calculus.



IMPRESSION: 

Questionable left renal calculus. The abdomen is otherwise

unremarkable.



Dictated by: 



  Dictated on workstation # [id number]

## 2021-05-12 ENCOUNTER — HOSPITAL ENCOUNTER (OUTPATIENT)
Dept: HOSPITAL 75 - RAD | Age: 21
End: 2021-05-12
Attending: SURGERY
Payer: COMMERCIAL

## 2021-05-12 ENCOUNTER — HOSPITAL ENCOUNTER (EMERGENCY)
Dept: HOSPITAL 75 - ER FS | Age: 21
Discharge: HOME | End: 2021-05-12
Payer: COMMERCIAL

## 2021-05-12 VITALS — SYSTOLIC BLOOD PRESSURE: 123 MMHG | DIASTOLIC BLOOD PRESSURE: 94 MMHG

## 2021-05-12 VITALS — HEIGHT: 63.78 IN | BODY MASS INDEX: 22.1 KG/M2 | WEIGHT: 127.87 LBS

## 2021-05-12 DIAGNOSIS — K29.70: Primary | ICD-10-CM

## 2021-05-12 DIAGNOSIS — Z88.5: ICD-10-CM

## 2021-05-12 DIAGNOSIS — R10.11: ICD-10-CM

## 2021-05-12 DIAGNOSIS — Z88.8: ICD-10-CM

## 2021-05-12 DIAGNOSIS — R10.13: Primary | ICD-10-CM

## 2021-05-12 PROCEDURE — 99283 EMERGENCY DEPT VISIT LOW MDM: CPT

## 2021-05-12 PROCEDURE — 76705 ECHO EXAM OF ABDOMEN: CPT

## 2021-05-12 NOTE — DIAGNOSTIC IMAGING REPORT
PROCEDURE: US Gallbladder.



TECHNIQUE: Multiple real-time grayscale images were obtained over

the right upper quadrant in various projections.



INDICATION:  Right upper quadrant pain and epigastric pain

radiating to the right shoulder.



Liver is normal in size at 16 cm. No discrete liver mass is

identified. Portal vein is patent and shows normal direction of

flow. Gallbladder is without stones or sludge. There is  no wall

thickening or biliary ductal dilatation. Pancreas unremarkable.

Aorta is nonaneurysmal. IVC is patent. Right kidney is without

calculi or hydronephrosis. There is no ascites.



IMPRESSION: No evidence of cholelithiasis or acute cholecystitis.



Dictated by: 



  Dictated on workstation # [id number]

## 2021-05-12 NOTE — ED ABDOMINAL PAIN
General


Chief Complaint:  Abdominal/GI Problems


Stated Complaint:  EPIGASTRIC PAIN


Nursing Triage Note:  


CHRONIC EPIGASTRIC PAIN.  PT HAS BEEN TO GENERAL SURGEON, HAD EGD A YEAR AGO NA 


LENNOX OCCASIONAL EPIGASTRIC PAIN.  SHE HAS NOT TAKEN ANYTHING OTC PRIOR TO 


ARRIVAL.  SHE HAS A HX OF GASTRITIS.


Sepsis Screen:  No Definite Risk


Source of Information:  Patient





History of Present Illness


Date Seen by Provider:  May 12, 2021


Time Seen by Provider:  08:00


Initial Comments


Patient is a 20-year-old female with history of gastritis who presents with 

epigastric pain for several hours.  Pain is sharp nonradiating rated moderate to

severe.  Pain is worse at night and after eating.  No medications or therapies 

taken prior to ED arrival.  Patient has had outpatient EGD which showed 

gastritis and gallbladder ultrasound which was negative.  Patient discontinued 

her PPI 2 days ago due to hives and has not taken the new medication her PCP 

prescribed due to concern for possible side effects.  No reported hematemesis, 

coffee-ground emesis melena or hematochezia.  No other acute symptoms or 

complaints.


Timing/Duration:  4-6 Hours


Severity/Quality:  Burning


Location:  Other


Radiation:  Other


Activities at Onset:  Other


Modifying Factors:  Improves With Other


Associated Symptoms:  Other





Allergies and Home Medications


Allergies


Coded Allergies:  


     hydrocodone (Unverified  Adverse Reaction, Mild, hives, 5/12/21)


     pantoprazole (Unverified  Adverse Reaction, Mild, hives, 5/12/21)





Home Medications


[bcp]  , 1 TAB PO DAILY, (Reported)





Patient Home Medication List


Home Medication List Reviewed:  Yes





Review of Systems


Review of Systems


Constitutional:  see HPI


EENTM:  See HPI


Respiratory:  See HPI


Cardiovascular:  See HPI


Gastrointestinal:  See HPI


Genitourinary:  See HPI


Musculoskeletal:  see HPI


Skin:  see HPI


Psychiatric/Neurological:  See HPI


Endocrine:  See HPI


Hematologic/Lymphatic:  See HPI





Past Medical-Social-Family Hx


Past Med/Social Hx:  Reviewed Nursing Past Med/Soc Hx


Patient Social History


Alcohol Use:  Denies Use


Smoking Status:  Never a Smoker


Recent Infectious Disease Expo:  No


Recent Hopitalizations:  No





Seasonal Allergies


Seasonal Allergies:  No





Past Medical History


Surgeries:  Yes (R knee scope, bilat femur osteotomy, then hardware removed 

twice, DXLS)


Joint Replacement


Respiratory:  No


Cardiac:  No


Neurological:  No


Reproductive Disorders:  No


Female Reproductive Disorders:  Menstrual Problems, Ovarian Cyst


Sexually Transmitted Disease:  No


HIV/AIDS:  No


Genitourinary:  No


Gastrointestinal:  Yes (epigastric abd pain)


Musculoskeletal:  No


Endocrine:  No (not taking thyroid med last 6 months)


Hypothyroidsim


HEENT:  No


Cancer:  No


Psychosocial:  No


Integumentary:  No


Blood Disorders:  No


Adverse Reaction/Blood Tranf:  No





Family Medical History


No Pertinent Family Hx





Physical Exam


Vital Signs





Vital Signs - First Documented








 5/12/21





 07:50


 


Temp 37.0


 


Pulse 85


 


Resp 18


 


B/P (MAP) 123/94 (104)


 


Pulse Ox 99


 


O2 Delivery Room Air





Capillary Refill : Less Than 3 Seconds


Height/Weight/BMI


Height: 5'4.00"


Weight: 109lbs. 0.0oz. 49.028511hv; 22.00 BMI


Method:


General Appearance:  no apparent distress


Respiratory:  lungs clear, normal breath sounds


Cardiovascular:  regular rate, rhythm


Gastrointestinal:  soft, tenderness (Mild epigastric pain/tenderness)


Extremities:  non-tender





Focused Exam


Sepsis Stage:  Ruled Out





Progress/Results/Core Measures


Results/Orders


Vital Signs/I&O











 5/12/21





 07:50


 


Temp 37.0


 


Pulse 85


 


Resp 18


 


B/P (MAP) 123/94 (104)


 


Pulse Ox 99


 


O2 Delivery Room Air














Blood Pressure Mean:                    104











Departure


Communication (Admissions)


Patient with known gastritis off medication with recurrence of epigastric pain. 

 Abdomen soft nonsurgical.  No medications or therapies taken prior to ED 

arrival.  Patient does have a new prescription available to her at local 

pharmacy.  Recommendations to fill prescription and obtain an immediate relief 

and acid upon discharge.  If patient is to has further symptoms she is to 

follow-up with her PCP.





Impression





   Primary Impression:  


   EPIGASTRIC/ABD. PAIN


   Additional Impression:  


   Gastritis


Disposition:  01 HOME, SELF-CARE


Condition:  Stable





Departure-Patient Inst.


Decision time for Depature:  08:14


Referrals:  


NO,LOCAL PHYSICIAN (PCP/Family)


Primary Care Physician


Patient Instructions:  Gastritis





Add. Discharge Instructions:  


Go to local pharmacy upon discharge from the emergency department.  Purchase 

liquid Gaviscon or Maalox and Pepcid OTC.  Fill prescription provided to you by 

your PCP.  Follow-up with your PCP for further management.





All discharge instructions reviewed with patient and/or family. Voiced 

understanding.











SULEMA GUZMAN DO                   May 12, 2021 08:15

## 2021-05-17 ENCOUNTER — HOSPITAL ENCOUNTER (OUTPATIENT)
Dept: HOSPITAL 75 - CARD | Age: 21
End: 2021-05-17
Attending: SURGERY
Payer: COMMERCIAL

## 2021-05-17 DIAGNOSIS — R10.13: Primary | ICD-10-CM

## 2021-05-17 PROCEDURE — 78227 HEPATOBIL SYST IMAGE W/DRUG: CPT

## 2021-05-17 NOTE — DIAGNOSTIC IMAGING REPORT
INDICATION: Epigastric pain.



Patient was administered 5.0 mCi technetium 99m Choletec

intravenously and imaging over the abdomen was performed. At 1

hour, the patient ingested 38 ounces of Ensure and gallbladder

ejection fraction was calculated.



There is homogeneous uptake of activity by the liver with prompt

excretion of activity into the gallbladder. There is activity

within the gallbladder. Activity passes into the small bowel.

Gallbladder ejection fraction is abnormally low at 19%.



IMPRESSION:

1. Patent cystic duct and common bile duct.

2. Low gallbladder ejection fraction of 19%.



Dictated by: 



  Dictated on workstation # [id number]

## 2021-06-10 ENCOUNTER — HOSPITAL ENCOUNTER (OUTPATIENT)
Dept: HOSPITAL 75 - PREOP | Age: 21
Discharge: HOME | End: 2021-06-10
Attending: SURGERY
Payer: COMMERCIAL

## 2021-06-10 VITALS — BODY MASS INDEX: 22.36 KG/M2 | HEIGHT: 64.02 IN | WEIGHT: 130.95 LBS

## 2021-06-10 DIAGNOSIS — Z01.818: Primary | ICD-10-CM

## 2021-06-17 ENCOUNTER — HOSPITAL ENCOUNTER (OUTPATIENT)
Dept: HOSPITAL 75 - SDC | Age: 21
Discharge: HOME | End: 2021-06-17
Attending: SURGERY
Payer: COMMERCIAL

## 2021-06-17 VITALS — DIASTOLIC BLOOD PRESSURE: 67 MMHG | SYSTOLIC BLOOD PRESSURE: 105 MMHG

## 2021-06-17 VITALS — HEIGHT: 64.02 IN | BODY MASS INDEX: 22.36 KG/M2 | WEIGHT: 130.95 LBS

## 2021-06-17 VITALS — DIASTOLIC BLOOD PRESSURE: 72 MMHG | SYSTOLIC BLOOD PRESSURE: 110 MMHG

## 2021-06-17 VITALS — SYSTOLIC BLOOD PRESSURE: 110 MMHG | DIASTOLIC BLOOD PRESSURE: 77 MMHG

## 2021-06-17 VITALS — DIASTOLIC BLOOD PRESSURE: 67 MMHG | SYSTOLIC BLOOD PRESSURE: 110 MMHG

## 2021-06-17 VITALS — DIASTOLIC BLOOD PRESSURE: 69 MMHG | SYSTOLIC BLOOD PRESSURE: 108 MMHG

## 2021-06-17 VITALS — DIASTOLIC BLOOD PRESSURE: 63 MMHG | SYSTOLIC BLOOD PRESSURE: 112 MMHG

## 2021-06-17 VITALS — DIASTOLIC BLOOD PRESSURE: 59 MMHG | SYSTOLIC BLOOD PRESSURE: 106 MMHG

## 2021-06-17 VITALS — SYSTOLIC BLOOD PRESSURE: 108 MMHG | DIASTOLIC BLOOD PRESSURE: 77 MMHG

## 2021-06-17 VITALS — DIASTOLIC BLOOD PRESSURE: 95 MMHG | SYSTOLIC BLOOD PRESSURE: 131 MMHG

## 2021-06-17 VITALS — DIASTOLIC BLOOD PRESSURE: 66 MMHG | SYSTOLIC BLOOD PRESSURE: 110 MMHG

## 2021-06-17 DIAGNOSIS — Z79.890: ICD-10-CM

## 2021-06-17 DIAGNOSIS — Z79.899: ICD-10-CM

## 2021-06-17 DIAGNOSIS — K82.8: ICD-10-CM

## 2021-06-17 DIAGNOSIS — K81.1: Primary | ICD-10-CM

## 2021-06-17 DIAGNOSIS — E06.3: ICD-10-CM

## 2021-06-17 PROCEDURE — 88304 TISSUE EXAM BY PATHOLOGIST: CPT

## 2021-06-17 PROCEDURE — 76000 FLUOROSCOPY <1 HR PHYS/QHP: CPT

## 2021-06-17 PROCEDURE — 87081 CULTURE SCREEN ONLY: CPT

## 2021-06-17 PROCEDURE — 84703 CHORIONIC GONADOTROPIN ASSAY: CPT

## 2021-06-17 NOTE — DIAGNOSTIC IMAGING REPORT
Fluoroscopy at [facility].



INDICATION: Abdominal pain



Fluoroscopic assistance was provided for Dr. Uriel Dougherty during

his laparoscopic cholecystectomy procedure. 8 seconds of

fluoroscopy time was utilized. 31 spot films of the right upper

quadrant were obtained.



There are laparoscopic devices in place. The common bile duct has

been opacified via the cystic duct catheter. The, common bile

duct does not seem to be dilated and there is no definite defect

to suggest a retained calculus. However a small portion of the

duct is partially obscured by one of the instruments.



Contrast is seen extending into the small bowel. 



IMPRESSION:

1. Fluoroscopic assistance was provided for Dr. Dougherty.



Dictated by: 



  Dictated on workstation # [id number]

## 2021-06-17 NOTE — DISCHARGE INST-SIMPLE/STANDARD
Discharge Inst-Standard


Discharge Medications


New, Converted or Re-Newed RX:  Transmitted to Pharmacy





Patient Instructions/Follow Up


Plan of Care/Instructions/FU:  


2 weeks Farhat


Activity as Tolerated:  No


Discharge Diet:  Regular Diet


Other Inst to Patient


Follow up Appt:


Make appointment for 2 weeks.





Instructions:


No lifting greater than 10 pounds.


No strenuous activity. 


May shower in 24 hours, no tub bath or soaking.


Use incentive spirometer at home as directed.


No Smoking





Skin/Wound Care:


You have special glue over incision, it will fall off on it's own.





Symptoms to Report:


Appetite Changes, Extremity Discoloration, Numbness/Tingling, Swelling 

Increased, Bleeding Excessive, Eyesight Changes, Pain Increased, Urine Color 

Change, Constipation(Persistent), Fever over 101 degree F, Pain/Pressure in 

chest, Urinating Difficulty, Cough Up/Vomit Blood, Heart Beat Irreg/Pounding, 

Pain/Pressure in jaw, Vaginal Bleeding Increase, Cramps in feet or legs, 

Lightheadedness, Pain/Pressure in shoulder, Diarrhea(Persistent), Memory Changes

Suddenly, Questions/Concerns, Weight gain consecutive days, Dizziness/Fainting, 

Nausea/Vomiting, Shortness of Breath, Weight gain over 2 pounds.





If eyes or skin turn yellow notify physician.








If questions or concerns contact your physician 


Or seek help at emergency department.











ANSHUL HOLDEN DO              Jun 17, 2021 10:18

## 2021-06-17 NOTE — PROGRESS NOTE-POST OPERATIVE
Post-Operative Progess Note


Surgeon (s)/Assistant (s)


Surgeon


ANSHUL HOLDEN DO


Assistant:  Dr. Fuller to assist in retraction dissection and closure.





Pre-Operative Diagnosis


epigastric abdominal pain, biliary dyskinesia





Post-Operative Diagnosis





same





Procedure & Operative Findings


Date of Procedure


6/17/21


Procedure Performed/Findings


  


PROCEDURE:


Laparoscopic cholecystectomy with intraoperative


cholangiogram. 


 


COMPLICATIONS:


None. 


 


PROCEDURE:


The patient was taken to the operating suite and was prepped and


draped in sterile fashion. A surgical pause was performed. Just


superior to the umbilicus, a 12 mm incision was made. Dissection


was taken down to the fascia, which was then scored and grasped


with a Kocher and the abdomen was then entered.  A 0 Vicryl


suture was placed in a figure-of-eight fashion and a Lazo


trocar was placed and secured. Pneumoperitoneum was achieved.


A 5mm trochar place in the subxyphoid and 2 in the right upper quadrant.


Small bilateral hernia present. Uterus, fallopian tubes and ovaries appear 

normal.


The gallbladder was then grasped and elevated. The


cystic duct, and cystic artery were then 


dissected out. Clip was placed on the distal


portion of the cystic duct which was then partially transected.


An arrow catheter was inserted into the duct. 


The cholangiogram was then performed. No filing defects and contrast


made its way into the duodenum.  Catheter removed. Clips were placed


on proximal portion of the cystic duct and then the duct was then


transected. Clips were placed along the proximal and distal


portion of the cystic artery which was then transected. Hook


cautery was used to dissect the gallbladder from the gallbladder


fossa achieving hemostasis. The gallbladder was placed in an


Endobag and removed through the 12 mm trocar site. The abdomen


was then reinspected.  Copious amounts of irrigation were used to


irrigate the abdomen and there were no signs of active bleeding.


Hemostasis had been achieved. The 12 mm fascial defect was then


closed with 0 Vicryl suture that had been placed in a


figure-of-eight fashion. The abdomen was then desufflated, the


trocars were removed. The abdomen was then washed and dried. The


skin was then closed using 4-0 Monocryl in a subcuticular fashion.


The abdomen was washed and dried and Skin Affix was place over incisions.


Patient tolerated the procedure well without any complications 


and was taken to the recovery room in stable condition.


Anesthesia Type


general





Estimated Blood Loss


Estimated blood loss (mL):  minimal





Specimens/Packing


Specimens Removed


gallbladder











ANSHUL HOLDEN DO              Jun 17, 2021 10:13

## 2021-06-17 NOTE — PROGRESS NOTE-PRE OPERATIVE
Pre-Operative Progress Note


H&P Reviewed


The H&P was reviewed, patient examined and no changes noted.


Date Seen by Provider:  Jun 17, 2021


Time Seen by Provider:  08:31


Date H&P Reviewed:  Jun 17, 2021


Time H&P Reviewed:  08:31


Pre-Operative Diagnosis:  epigastric abdominal pain, biliary dyskinesia











ANSHUL HOLDEN DO              Jun 17, 2021 08:32

## 2021-06-17 NOTE — ANESTHESIA-GENERAL POST-OP
General


Patient Condition


Mental Status/LOC:  Same as Preop


Cardiovascular:  Satisfactory


Nausea/Vomiting:  Absent


Respiratory:  Satisfactory


Pain:  Controlled


Complications:  Absent





Post Op Complications


Complications


None





Follow Up Care/Instructions


Patient Instructions


None needed.





Anesthesia/Patient Condition


Patient Condition


Patient is doing well, no complaints, stable vital signs, no apparent adverse 

anesthesia problems.   


No complications reported per nursing.











PAIGE LIMA CRNA          Jun 17, 2021 12:20